# Patient Record
Sex: FEMALE | Race: WHITE
[De-identification: names, ages, dates, MRNs, and addresses within clinical notes are randomized per-mention and may not be internally consistent; named-entity substitution may affect disease eponyms.]

---

## 2022-04-17 ENCOUNTER — HOSPITAL ENCOUNTER (INPATIENT)
Dept: HOSPITAL 95 - ER | Age: 67
LOS: 4 days | Discharge: HOME HEALTH | DRG: 854 | End: 2022-04-21
Attending: SURGERY | Admitting: SURGERY
Payer: MEDICARE

## 2022-04-17 VITALS — WEIGHT: 293 LBS | HEIGHT: 63 IN | BODY MASS INDEX: 51.91 KG/M2

## 2022-04-17 DIAGNOSIS — Z79.899: ICD-10-CM

## 2022-04-17 DIAGNOSIS — A41.51: Primary | ICD-10-CM

## 2022-04-17 DIAGNOSIS — E66.01: ICD-10-CM

## 2022-04-17 DIAGNOSIS — I10: ICD-10-CM

## 2022-04-17 DIAGNOSIS — M19.90: ICD-10-CM

## 2022-04-17 DIAGNOSIS — I73.9: ICD-10-CM

## 2022-04-17 DIAGNOSIS — N13.6: ICD-10-CM

## 2022-04-17 DIAGNOSIS — E78.5: ICD-10-CM

## 2022-04-17 DIAGNOSIS — E03.9: ICD-10-CM

## 2022-04-17 DIAGNOSIS — Z79.84: ICD-10-CM

## 2022-04-17 DIAGNOSIS — E87.6: ICD-10-CM

## 2022-04-17 DIAGNOSIS — Z90.49: ICD-10-CM

## 2022-04-17 DIAGNOSIS — Z20.822: ICD-10-CM

## 2022-04-17 DIAGNOSIS — K42.0: ICD-10-CM

## 2022-04-17 DIAGNOSIS — Z66: ICD-10-CM

## 2022-04-17 DIAGNOSIS — E11.9: ICD-10-CM

## 2022-04-17 LAB
ALBUMIN SERPL BCP-MCNC: 3.4 G/DL (ref 3.4–5)
ALBUMIN/GLOB SERPL: 0.9 {RATIO} (ref 0.8–1.8)
ALT SERPL W P-5'-P-CCNC: 39 U/L (ref 12–78)
ANION GAP SERPL CALCULATED.4IONS-SCNC: 7 MMOL/L (ref 6–16)
AST SERPL W P-5'-P-CCNC: 18 U/L (ref 12–37)
BASOPHILS # BLD AUTO: 0.03 K/MM3 (ref 0–0.23)
BASOPHILS NFR BLD AUTO: 0 % (ref 0–2)
BILIRUB SERPL-MCNC: 0.9 MG/DL (ref 0.1–1)
BUN SERPL-MCNC: 19 MG/DL (ref 8–24)
CALCIUM SERPL-MCNC: 9.1 MG/DL (ref 8.5–10.1)
CHLORIDE SERPL-SCNC: 98 MMOL/L (ref 98–108)
CO2 SERPL-SCNC: 31 MMOL/L (ref 21–32)
CREAT SERPL-MCNC: 0.61 MG/DL (ref 0.4–1)
DEPRECATED RDW RBC AUTO: 46.3 FL (ref 35.1–46.3)
EOSINOPHIL # BLD AUTO: 0.02 K/MM3 (ref 0–0.68)
EOSINOPHIL NFR BLD AUTO: 0 % (ref 0–6)
ERYTHROCYTE [DISTWIDTH] IN BLOOD BY AUTOMATED COUNT: 15.1 % (ref 11.7–14.2)
GLOBULIN SER CALC-MCNC: 3.7 G/DL (ref 2.2–4)
GLUCOSE SERPL-MCNC: 205 MG/DL (ref 70–99)
HCT VFR BLD AUTO: 47.5 % (ref 33–51)
HGB BLD-MCNC: 14.7 G/DL (ref 11.5–16)
IMM GRANULOCYTES # BLD AUTO: 0.09 K/MM3 (ref 0–0.1)
IMM GRANULOCYTES NFR BLD AUTO: 1 % (ref 0–1)
KETONES UR STRIP-MCNC: (no result) MG/DL
LEUKOCYTE ESTERASE UR QL STRIP: (no result)
LYMPHOCYTES # BLD AUTO: 1.37 K/MM3 (ref 0.84–5.2)
LYMPHOCYTES NFR BLD AUTO: 9 % (ref 21–46)
MCHC RBC AUTO-ENTMCNC: 30.9 G/DL (ref 31.5–36.5)
MCV RBC AUTO: 85 FL (ref 80–100)
MONOCYTES # BLD AUTO: 0.62 K/MM3 (ref 0.16–1.47)
MONOCYTES NFR BLD AUTO: 4 % (ref 4–13)
NEUTROPHILS # BLD AUTO: 13.67 K/MM3 (ref 1.96–9.15)
NEUTROPHILS NFR BLD AUTO: 87 % (ref 41–73)
NRBC # BLD AUTO: 0 K/MM3 (ref 0–0.02)
NRBC BLD AUTO-RTO: 0 /100 WBC (ref 0–0.2)
PLATELET # BLD AUTO: 332 K/MM3 (ref 150–400)
POTASSIUM SERPL-SCNC: 3.9 MMOL/L (ref 3.5–5.5)
PROT SERPL-MCNC: 7.1 G/DL (ref 6.4–8.2)
PROT UR STRIP-MCNC: (no result) MG/DL
RBC #/AREA URNS HPF: (no result) /HPF (ref 0–2)
SODIUM SERPL-SCNC: 136 MMOL/L (ref 136–145)
SP GR SPEC: 1.02 (ref 1–1.02)
URN SPEC COLLECT METH UR: (no result)
UROBILINOGEN UR STRIP-MCNC: (no result) MG/DL

## 2022-04-17 PROCEDURE — C1894 INTRO/SHEATH, NON-LASER: HCPCS

## 2022-04-17 PROCEDURE — C1769 GUIDE WIRE: HCPCS

## 2022-04-17 PROCEDURE — 0WQF0ZZ REPAIR ABDOMINAL WALL, OPEN APPROACH: ICD-10-PCS | Performed by: SURGERY

## 2022-04-17 PROCEDURE — C1729 CATH, DRAINAGE: HCPCS

## 2022-04-17 PROCEDURE — A9270 NON-COVERED ITEM OR SERVICE: HCPCS

## 2022-04-17 PROCEDURE — 0DBU0ZZ EXCISION OF OMENTUM, OPEN APPROACH: ICD-10-PCS | Performed by: SURGERY

## 2022-04-17 PROCEDURE — C1751 CATH, INF, PER/CENT/MIDLINE: HCPCS

## 2022-04-17 PROCEDURE — 3E03329 INTRODUCTION OF OTHER ANTI-INFECTIVE INTO PERIPHERAL VEIN, PERCUTANEOUS APPROACH: ICD-10-PCS | Performed by: SURGERY

## 2022-04-18 LAB
ALBUMIN SERPL BCP-MCNC: 2.6 G/DL (ref 3.4–5)
ALBUMIN/GLOB SERPL: 0.9 {RATIO} (ref 0.8–1.8)
ALT SERPL W P-5'-P-CCNC: 30 U/L (ref 12–78)
ANION GAP SERPL CALCULATED.4IONS-SCNC: 7 MMOL/L (ref 6–16)
AST SERPL W P-5'-P-CCNC: 14 U/L (ref 12–37)
BASOPHILS # BLD AUTO: 0.03 K/MM3 (ref 0–0.23)
BASOPHILS NFR BLD AUTO: 0 % (ref 0–2)
BILIRUB SERPL-MCNC: 0.4 MG/DL (ref 0.1–1)
BUN SERPL-MCNC: 17 MG/DL (ref 8–24)
CALCIUM SERPL-MCNC: 8 MG/DL (ref 8.5–10.1)
CHLORIDE SERPL-SCNC: 102 MMOL/L (ref 98–108)
CO2 SERPL-SCNC: 30 MMOL/L (ref 21–32)
CREAT SERPL-MCNC: 0.62 MG/DL (ref 0.4–1)
DEPRECATED RDW RBC AUTO: 47 FL (ref 35.1–46.3)
EOSINOPHIL # BLD AUTO: 0 K/MM3 (ref 0–0.68)
EOSINOPHIL NFR BLD AUTO: 0 % (ref 0–6)
ERYTHROCYTE [DISTWIDTH] IN BLOOD BY AUTOMATED COUNT: 15.1 % (ref 11.7–14.2)
GLOBULIN SER CALC-MCNC: 2.9 G/DL (ref 2.2–4)
GLUCOSE SERPL-MCNC: 177 MG/DL (ref 70–99)
HCT VFR BLD AUTO: 39.5 % (ref 33–51)
HGB BLD-MCNC: 12.1 G/DL (ref 11.5–16)
IMM GRANULOCYTES # BLD AUTO: 0.1 K/MM3 (ref 0–0.1)
IMM GRANULOCYTES NFR BLD AUTO: 1 % (ref 0–1)
KETONES UR STRIP-MCNC: (no result) MG/DL
LEUKOCYTE ESTERASE UR QL STRIP: (no result)
LYMPHOCYTES # BLD AUTO: 1.18 K/MM3 (ref 0.84–5.2)
LYMPHOCYTES NFR BLD AUTO: 7 % (ref 21–46)
MAGNESIUM SERPL-MCNC: 1.9 MG/DL (ref 1.6–2.4)
MCHC RBC AUTO-ENTMCNC: 30.6 G/DL (ref 31.5–36.5)
MCV RBC AUTO: 86 FL (ref 80–100)
MONOCYTES # BLD AUTO: 0.7 K/MM3 (ref 0.16–1.47)
MONOCYTES NFR BLD AUTO: 4 % (ref 4–13)
NEUTROPHILS # BLD AUTO: 14.02 K/MM3 (ref 1.96–9.15)
NEUTROPHILS NFR BLD AUTO: 87 % (ref 41–73)
NRBC # BLD AUTO: 0 K/MM3 (ref 0–0.02)
NRBC BLD AUTO-RTO: 0 /100 WBC (ref 0–0.2)
PLATELET # BLD AUTO: 277 K/MM3 (ref 150–400)
POTASSIUM SERPL-SCNC: 3.2 MMOL/L (ref 3.5–5.5)
PROT SERPL-MCNC: 5.5 G/DL (ref 6.4–8.2)
PROT UR STRIP-MCNC: (no result) MG/DL
RBC #/AREA URNS HPF: (no result) /HPF (ref 0–2)
SODIUM SERPL-SCNC: 139 MMOL/L (ref 136–145)
SP GR SPEC: 1.01 (ref 1–1.02)
URN SPEC COLLECT METH UR: (no result)
UROBILINOGEN UR STRIP-MCNC: (no result) MG/DL
WBC #/AREA URNS HPF: (no result) /HPF (ref 0–5)

## 2022-04-18 NOTE — NUR
NOTIFIED DR CUELLAR AND CHARGE RN DUE TO PT DESATURATING INTO 60S WITH SLEEP.
WHEN PT WAKES UP, SATURATIONS IMPROVE BACK INTO THE 90S QUICKLY. CHARGE RN
SUGGESTED [facility]LOW CANNULA FOR EXTRA PRESSURE BUT DR CORTES STATES PT IS OK
ON NC AND SHOULD HAVE CONTINOUS BIOX WHEN GOING TO FLOOR

## 2022-04-18 NOTE — NUR
CALL TO SURGICAL FLOOR TO CHECK ON WISHES FOR CENTRAL LINE REMOVAL DUE TO THE
FACT THAT PT HAS 1 PERIPHERAL LINE AND POOR IV ACCESS. CLINICAL COORDINATOR ON
SURG STATED OK TO BRING OUT IF DR WAS COMFORTABLE WITH PT BEING ON SURGICAL
WITH IT. DR CUELLAR STATES OK TO SEND PT WITH IT SO THAT PT WILL CONTINUE TO HAVE
ACCESS.

## 2022-04-18 NOTE — NUR
PT EXTUBATED AT 1020. CURRENTLY ON 4LNC AT THIS TIME. NO ACUTE NEEDS. RESTING
QUIETLY. STATES PAIN IS 5/10. WILL MEDICATE AS ABLE

## 2022-04-18 NOTE — NUR
PATIENT ARRIVED TO UNIT FROM ICU. TRANSFERRED TO BED W/ OVERHEAD LIFT,
PATIENT TOLERATED WELL. REPORTS PAIN TO BE 5/10 TO ABDOMEN AND REPORTS THAT TO
BE A TOLERABLE LEVEL. MIDLINE INCIOSION W/ ALEXANDRA, QUARTER SIZE DRAINAGE
PRESENT. TIFFANY IN [place], DRAINING LIGHT RED FLUID. NEW IN PLACE DRAINING TO
GRAVITY, CLEAR YELLOW URINE. NG TUBE IN PLACE DRAINING LIGHT RED/ YELLOW FLUID
TO LOW INT SUCTION. 4L O2 IN PLACE VIA NC. DENIES CP/SOB, DENIES N/T, DENIES
N/V, REMAINS NPO. ORIENTED PATIENT TO ROOM & CALL LIGHT. WILL CONTINUE TO
MONITOR & REPORT TO ONCOMING RN.

## 2022-04-18 NOTE — NUR
DR CUELLAR CAME TO SEE PT AND CHANGED VENT SETTINGS TO PS WITH CURRENT SETTING
5/5 WITH 40% FIO2. SEDATION ON SB. DR FONTANEZ CAME TO SEE PT AND GAVE NO NEW
ORDERS ON HERNIA SITE AT THIS TIME. DRS STATE PT HAS A HYDRONEPHROSIS THAT
WILL BE ADDRESSED AT A LATER TIME, AFTER RESPIRATORY STATUS IS STABILIZED

## 2022-04-18 NOTE — NUR
ASSUMED CARE:
 
PT INTUBATED AND SEDATED WITH PROPOFOL AT 40MCG/KG. VENT SETTINGS
18/350/8/40%. OG IN PLACE WITH GREEN DRAINAGE, SET AT LIS. STOOL THIS AM,
BROWN, LIQUID. BT HYPOACTIVE. NEW IN PLACE DRAINING CLEAR YELLOW URINE.
BILATERAL WRIST RESTRAINTS TO PROTECT LINES. MIDLINE DRESSING WITH ALEXANDRA AND TIFFANY
DRAIN DRAINING CRANBERRY COLORED FLUID. NO ACUTE NEEDS AT THIS TIME.

## 2022-04-18 NOTE — NUR
CALL TO DR FONTANEZ REGARDING PT'S FLUID ORDER.  CHANGED FLUID TO LR.  AWARE
THAT BROWN OUTPUT NOTED AND 800ML IN CANISTER THIS SHIFT. PT C/O PAIN WITH
REPOSITIONING. CURRENT PAIN MED ORDERS TO REMAIN.  WISHES TO MONITOR PT A
FEW HOURS MORE IN ICU THEN OK TO MOVE TO SURG IF NO ACUTE ISSUES.

## 2022-04-18 NOTE — NUR
REPORT CALLED TO PARVI[facility] MONTEZ. PT TRANSFERRED WITH NG CLAMPED FOR LIS IN
SURGICAL FLOOR. TRANSFERRED VIA BED ON 2L. RN AWARE THAT PT IS TO BE ON
CONTINUOUS BIOX DUE DESATURATING WITH SLEEP. FAMILY CALLED FOR UPDATE AND WERE
MADE AWARE OF TRANSFER.

## 2022-04-18 NOTE — NUR
ARRIVAL TO ICU
PT ARRIVED TO ICU 16 AT 2205 FROM OR ON ICU BED. PT INTUBATED WITH VENT
SETTINGS AC/VC+ 18/375/8/40%. PT IS ALERT AND ANSWERING Y/N QUESTIONS
APPROPRIATLY; PT IS ABLE TO COMMUNICATE WITH HAND GESTURES TOO; PROPOFOL
STARTED AT 30MCG/KG/MIN AND TITRATED UP TO 40MCG/KG/MIN FOR VENT COMPLIENCE.
AFEBRILE. HR 60-80'S. -170'S. HYPOACTIVE BT; TIFFANY DRAIN IN PLACE AND
DRAINING SEROSANGUINOUS FLUID; DRESSING C/D/I; WOUND VAC TO MIDLINE ABD,
DRESSING INTACT; NG TO LIS. NEW IN PLACE AND DRAINING TO GRAVITY. SINGLE 20G
IV IN RT AC. NS INFUSING AT 75ML/HR. SEE ADDMISSION ASSESSMENT FOR FULL
ASSESSMENT.
 
ATTEMPTED TO PLACE ANOTHER PERIPHERAL IV AND NOT SUCCESSFUL. CHARGE RN MELISSA
ATTMEPTED TWO OTHER PERIPHERAL IV'S AND NOT SUCCESSFUL. MELISSA ATTEMPTED
POWERGLIDE AND WAS NOT ABLE TO PLACE ONE. DR RIVERA CALLED FOR A CENTRAL
LINE. PLAN FOR HIM TO PLACE A CENTRAL LINE.

## 2022-04-18 NOTE — NUR
END OF SHIFT SUMMARY
PT CONT TO BE INTUBATED WITH VENT SETTINGS AC/VC+ 18/375/8/40%. PROPOFOL
INFUSING AT 40MCG/KG/MIN; PT ABLE TO OPEN EYES TO VERBAL STIMULI. AFEBRILE. HR
60'S. -140'S. PT HAD TWO LARGE LOOSE BM'S THIS SHIFT; TIFFANY DRAINING WITH
50ML OUTPUT; WOUND VAC DRESSING INTACT; NG TO LIS. NEW INPLACE WITH 650ML
URINE OUTPUT. PT MYRNA FOLDS VERY RED AND YEAST LOOKING; PICTURES IN CHART;
AREA CLEANED AND DRIED. CENTRAL LINE TO RT IJ IN PLACE; PT TOLERATED
PROCEDURE WILL. NS INFUSING AT 75ML/HR. REPORT GIVEN TO GENA LAI RN.

## 2022-04-19 LAB
ALBUMIN SERPL BCP-MCNC: 2.4 G/DL (ref 3.4–5)
ANION GAP SERPL CALCULATED.4IONS-SCNC: 4 MMOL/L (ref 6–16)
BASOPHILS # BLD AUTO: 0.03 K/MM3 (ref 0–0.23)
BASOPHILS NFR BLD AUTO: 0 % (ref 0–2)
BUN SERPL-MCNC: 13 MG/DL (ref 8–24)
CALCIUM SERPL-MCNC: 8.2 MG/DL (ref 8.5–10.1)
CHLORIDE SERPL-SCNC: 105 MMOL/L (ref 98–108)
CO2 SERPL-SCNC: 33 MMOL/L (ref 21–32)
CREAT SERPL-MCNC: 0.66 MG/DL (ref 0.4–1)
DEPRECATED RDW RBC AUTO: 49.1 FL (ref 35.1–46.3)
EOSINOPHIL # BLD AUTO: 0.13 K/MM3 (ref 0–0.68)
EOSINOPHIL NFR BLD AUTO: 1 % (ref 0–6)
ERYTHROCYTE [DISTWIDTH] IN BLOOD BY AUTOMATED COUNT: 15.5 % (ref 11.7–14.2)
GLUCOSE SERPL-MCNC: 144 MG/DL (ref 70–99)
HCT VFR BLD AUTO: 40.5 % (ref 33–51)
HGB BLD-MCNC: 12.2 G/DL (ref 11.5–16)
IMM GRANULOCYTES # BLD AUTO: 0.11 K/MM3 (ref 0–0.1)
IMM GRANULOCYTES NFR BLD AUTO: 1 % (ref 0–1)
LYMPHOCYTES # BLD AUTO: 1.74 K/MM3 (ref 0.84–5.2)
LYMPHOCYTES NFR BLD AUTO: 11 % (ref 21–46)
MCHC RBC AUTO-ENTMCNC: 30.1 G/DL (ref 31.5–36.5)
MCV RBC AUTO: 87 FL (ref 80–100)
MONOCYTES # BLD AUTO: 0.91 K/MM3 (ref 0.16–1.47)
MONOCYTES NFR BLD AUTO: 6 % (ref 4–13)
NEUTROPHILS # BLD AUTO: 12.66 K/MM3 (ref 1.96–9.15)
NEUTROPHILS NFR BLD AUTO: 81 % (ref 41–73)
NRBC # BLD AUTO: 0 K/MM3 (ref 0–0.02)
NRBC BLD AUTO-RTO: 0 /100 WBC (ref 0–0.2)
PHOSPHATE SERPL-MCNC: 2.8 MG/DL (ref 2.5–4.9)
PLATELET # BLD AUTO: 249 K/MM3 (ref 150–400)
POTASSIUM SERPL-SCNC: 3.1 MMOL/L (ref 3.5–5.5)
SODIUM SERPL-SCNC: 142 MMOL/L (ref 136–145)

## 2022-04-19 NOTE — NUR
PT COMPLAINS OF LOWER BACK PAIN FREQUENTLY BUT CONTINUES TO DECLINE BEING
REPOSITIONED IN BED, EXTRA PILLOWS, AND A HEATING PAD. WILL CONT TO OFFER, BUT
PT ONLY WANTS HER HEAD OF BED RAISED AND LOWERED AT THIS TIME.

## 2022-04-19 NOTE — NUR
SHIFT SUMMARY
PT ADVANCED TO CLEAR LIQUIDS THIS AM. PT TOLERATING DIET WELL. PT'S NG TUBE
DCED DURING THIS SHIFT. PT OUT OF BED AND TO CHAIR W/ ASSISTANCE FROM PT. PT
TO BE NPO AT MIDNIGHT PER DR FONTANEZ. PT RECEIVED MEDICATIONS AS ORDERED
THROUGHOUT SHIFT. PT'S PAIN CONTROLLED W/ NORCO ADMINISTERED AS ORDERED. PT
RESTED THROUGHOUT DURATION OF SHIFT AND CONTINUED TO REST IN CHAIR AT THIS
TIME.

## 2022-04-19 NOTE — NUR
SHIFT SUMMARY
 
NO ACUTE CHANGES THIS SHIFT. NGT TO LIS WITH BROWN DRAINAGE. TIFFANY WITH MINIMAL
SS DRAINAGE. ALEXADNRA MIDLINE REMAINS INTACT. IVF INFUSING PER ORDERS. 50 MCG
FENTANYL FOR PAIN PRN. PT REFUSES TO BE REPOSITIONED IN BED T/O SHIFT. NEW
INTACT AND DRAINING. USES CALL LIGHT APPROPRIATELY.

## 2022-04-20 LAB
ANION GAP SERPL CALCULATED.4IONS-SCNC: 3 MMOL/L (ref 6–16)
BASOPHILS # BLD AUTO: 0.05 K/MM3 (ref 0–0.23)
BASOPHILS NFR BLD AUTO: 0 % (ref 0–2)
BUN SERPL-MCNC: 12 MG/DL (ref 8–24)
CALCIUM SERPL-MCNC: 8.3 MG/DL (ref 8.5–10.1)
CHLORIDE SERPL-SCNC: 103 MMOL/L (ref 98–108)
CO2 SERPL-SCNC: 34 MMOL/L (ref 21–32)
CREAT SERPL-MCNC: 0.54 MG/DL (ref 0.4–1)
DEPRECATED RDW RBC AUTO: 50.1 FL (ref 35.1–46.3)
EOSINOPHIL # BLD AUTO: 0.32 K/MM3 (ref 0–0.68)
EOSINOPHIL NFR BLD AUTO: 3 % (ref 0–6)
ERYTHROCYTE [DISTWIDTH] IN BLOOD BY AUTOMATED COUNT: 15.5 % (ref 11.7–14.2)
GLUCOSE SERPL-MCNC: 136 MG/DL (ref 70–99)
HCT VFR BLD AUTO: 39.4 % (ref 33–51)
HGB BLD-MCNC: 11.6 G/DL (ref 11.5–16)
IMM GRANULOCYTES # BLD AUTO: 0.09 K/MM3 (ref 0–0.1)
IMM GRANULOCYTES NFR BLD AUTO: 1 % (ref 0–1)
LYMPHOCYTES # BLD AUTO: 2.24 K/MM3 (ref 0.84–5.2)
LYMPHOCYTES NFR BLD AUTO: 18 % (ref 21–46)
MCHC RBC AUTO-ENTMCNC: 29.4 G/DL (ref 31.5–36.5)
MCV RBC AUTO: 89 FL (ref 80–100)
MONOCYTES # BLD AUTO: 0.68 K/MM3 (ref 0.16–1.47)
MONOCYTES NFR BLD AUTO: 5 % (ref 4–13)
NEUTROPHILS # BLD AUTO: 9.22 K/MM3 (ref 1.96–9.15)
NEUTROPHILS NFR BLD AUTO: 73 % (ref 41–73)
NRBC # BLD AUTO: 0 K/MM3 (ref 0–0.02)
NRBC BLD AUTO-RTO: 0 /100 WBC (ref 0–0.2)
PLATELET # BLD AUTO: 239 K/MM3 (ref 150–400)
POTASSIUM SERPL-SCNC: 3.4 MMOL/L (ref 3.5–5.5)
SODIUM SERPL-SCNC: 140 MMOL/L (ref 136–145)

## 2022-04-20 PROCEDURE — 0T9030Z DRAINAGE OF RIGHT KIDNEY WITH DRAINAGE DEVICE, PERCUTANEOUS APPROACH: ICD-10-PCS | Performed by: RADIOLOGY

## 2022-04-20 NOTE — NUR
SHIFT SUMMARY
 
NO ACUTE CHANGES. PT SLEPT IN CHAIR. 2 NORCO FOR ABD/LOWER BACK PAIN. ALEXANDRA
REMAINS COMPRESSED AND INTACT. TIFFANY WITH SMALL SS DRAINAGE. PT WITH NO FLATUS
YET. DENIES NAUSEA. NPO SINCE MIDNIGHT FOR IR PROCEDURE. NEW IN PLACE. IVF
INFUSING PER ORDERS. USES CALL LIGHT APPROPRIATELY.

## 2022-04-21 LAB
ANION GAP SERPL CALCULATED.4IONS-SCNC: 2 MMOL/L (ref 6–16)
BASOPHILS # BLD AUTO: 0.04 K/MM3 (ref 0–0.23)
BASOPHILS NFR BLD AUTO: 0 % (ref 0–2)
BUN SERPL-MCNC: 12 MG/DL (ref 8–24)
CALCIUM SERPL-MCNC: 8.2 MG/DL (ref 8.5–10.1)
CHLORIDE SERPL-SCNC: 102 MMOL/L (ref 98–108)
CO2 SERPL-SCNC: 34 MMOL/L (ref 21–32)
CREAT SERPL-MCNC: 0.63 MG/DL (ref 0.4–1)
DEPRECATED RDW RBC AUTO: 50 FL (ref 35.1–46.3)
EOSINOPHIL # BLD AUTO: 0.21 K/MM3 (ref 0–0.68)
EOSINOPHIL NFR BLD AUTO: 1 % (ref 0–6)
ERYTHROCYTE [DISTWIDTH] IN BLOOD BY AUTOMATED COUNT: 15.2 % (ref 11.7–14.2)
GLUCOSE SERPL-MCNC: 139 MG/DL (ref 70–99)
HCT VFR BLD AUTO: 37.9 % (ref 33–51)
HGB BLD-MCNC: 11.1 G/DL (ref 11.5–16)
IMM GRANULOCYTES # BLD AUTO: 0.08 K/MM3 (ref 0–0.1)
IMM GRANULOCYTES NFR BLD AUTO: 1 % (ref 0–1)
LYMPHOCYTES # BLD AUTO: 0.79 K/MM3 (ref 0.84–5.2)
LYMPHOCYTES NFR BLD AUTO: 5 % (ref 21–46)
MCHC RBC AUTO-ENTMCNC: 29.3 G/DL (ref 31.5–36.5)
MCV RBC AUTO: 90 FL (ref 80–100)
MONOCYTES # BLD AUTO: 0.51 K/MM3 (ref 0.16–1.47)
MONOCYTES NFR BLD AUTO: 3 % (ref 4–13)
NEUTROPHILS # BLD AUTO: 14.27 K/MM3 (ref 1.96–9.15)
NEUTROPHILS NFR BLD AUTO: 90 % (ref 41–73)
NRBC # BLD AUTO: 0 K/MM3 (ref 0–0.02)
NRBC BLD AUTO-RTO: 0 /100 WBC (ref 0–0.2)
PLATELET # BLD AUTO: 216 K/MM3 (ref 150–400)
POTASSIUM SERPL-SCNC: 3.7 MMOL/L (ref 3.5–5.5)
SODIUM SERPL-SCNC: 138 MMOL/L (ref 136–145)

## 2022-04-21 NOTE — NUR
NIGHT SHIFT SUMMARY
NO ACUTE CHANGES THIS SHIFT. PT AAOX4 AND PLEASANT. ALEXANDRA DRESSING TO MIDLINE
INCISION WITH SCANT AMOUNT OF OLD DRAINAGE, NOTHING NEW THIS SHIFT. TIFFANY
DRAINING SMALL AMOUNT OF SEROSANGUINOUS FLUID. R NEPHROSTOMY DRAINING YELLOW
URINE WITH SLIGHT PINK TINGE. MEDICATED FOR BACK AND HEADACHE PAIN X1 THIS
SHIFT. VSS, WILL CONTINUE TO MONITOR.

## 2022-04-21 NOTE — NUR
Patient now has discharge orders entered.
 
Gathered all supporting documentation for referral (face sheet, face to face,
med list, H&P, and most recent PT assessment) and sent to [facility]
for review.
 
No further interventions required.
 
Camille Ye
Referral Liaison

## 2022-04-21 NOTE — NUR
DISCHARGE SUMMARY
PT A&OX4, VSS/RA, STEVIE PO, VOIDING, AMB W/FWW, UP TO CHAIR T/O SHIFT, OXYGEN
DELIVERED BY [facility]/AND THEN HEADING TO PT'S HOME. DC INS PROVIDED. PT REP
UNDERSTANDING THOSE INSTRUCTIONS INCLUDING FU WITH PCP, SURGEON AND UROLOGY.
LEFT FLOOR VIA WC TO GO HOME WITH MOM, WITH ALL PERSONAL POSSESSIONS INCLUDING
DC PACKET AND 1 NARC SCRIPT.

## 2022-05-27 ENCOUNTER — HOSPITAL ENCOUNTER (OUTPATIENT)
Dept: HOSPITAL 95 - ER | Age: 67
Setting detail: OBSERVATION
LOS: 1 days | Discharge: HOME | End: 2022-05-28
Attending: HOSPITALIST | Admitting: HOSPITALIST
Payer: MEDICARE

## 2022-05-27 VITALS — HEIGHT: 63 IN | BODY MASS INDEX: 51.91 KG/M2 | WEIGHT: 293 LBS

## 2022-05-27 DIAGNOSIS — Z66: ICD-10-CM

## 2022-05-27 DIAGNOSIS — E03.9: ICD-10-CM

## 2022-05-27 DIAGNOSIS — N13.0: ICD-10-CM

## 2022-05-27 DIAGNOSIS — T83.022A: Primary | ICD-10-CM

## 2022-05-27 DIAGNOSIS — I10: ICD-10-CM

## 2022-05-27 DIAGNOSIS — Z79.84: ICD-10-CM
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
## 2022-05-27 DIAGNOSIS — E11.9: ICD-10-CM

## 2022-05-27 DIAGNOSIS — Z79.899: ICD-10-CM

## 2022-05-27 DIAGNOSIS — E66.01: ICD-10-CM

## 2022-05-27 DIAGNOSIS — E78.5: ICD-10-CM

## 2022-05-27 LAB
ALBUMIN SERPL BCP-MCNC: 3.1 G/DL (ref 3.4–5)
ALBUMIN/GLOB SERPL: 0.9 {RATIO} (ref 0.8–1.8)
ALT SERPL W P-5'-P-CCNC: 15 U/L (ref 12–78)
ANION GAP SERPL CALCULATED.4IONS-SCNC: 7 MMOL/L (ref 6–16)
AST SERPL W P-5'-P-CCNC: 8 U/L (ref 12–37)
BASOPHILS # BLD AUTO: 0.04 K/MM3 (ref 0–0.23)
BASOPHILS NFR BLD AUTO: 0 % (ref 0–2)
BILIRUB SERPL-MCNC: 0.4 MG/DL (ref 0.1–1)
BUN SERPL-MCNC: 14 MG/DL (ref 8–24)
CALCIUM SERPL-MCNC: 9.1 MG/DL (ref 8.5–10.1)
CHLORIDE SERPL-SCNC: 101 MMOL/L (ref 98–108)
CO2 SERPL-SCNC: 31 MMOL/L (ref 21–32)
CREAT SERPL-MCNC: 0.65 MG/DL (ref 0.4–1)
DEPRECATED RDW RBC AUTO: 46.6 FL (ref 35.1–46.3)
EOSINOPHIL # BLD AUTO: 0.22 K/MM3 (ref 0–0.68)
EOSINOPHIL NFR BLD AUTO: 2 % (ref 0–6)
ERYTHROCYTE [DISTWIDTH] IN BLOOD BY AUTOMATED COUNT: 14.6 % (ref 11.7–14.2)
GLOBULIN SER CALC-MCNC: 3.3 G/DL (ref 2.2–4)
GLUCOSE SERPL-MCNC: 153 MG/DL (ref 70–99)
HCT VFR BLD AUTO: 40.6 % (ref 33–51)
HGB BLD-MCNC: 12 G/DL (ref 11.5–16)
IMM GRANULOCYTES # BLD AUTO: 0.08 K/MM3 (ref 0–0.1)
IMM GRANULOCYTES NFR BLD AUTO: 1 % (ref 0–1)
LEUKOCYTE ESTERASE UR QL STRIP: (no result)
LYMPHOCYTES # BLD AUTO: 2.37 K/MM3 (ref 0.84–5.2)
LYMPHOCYTES NFR BLD AUTO: 19 % (ref 21–46)
MCHC RBC AUTO-ENTMCNC: 29.6 G/DL (ref 31.5–36.5)
MCV RBC AUTO: 87 FL (ref 80–100)
MONOCYTES # BLD AUTO: 0.51 K/MM3 (ref 0.16–1.47)
MONOCYTES NFR BLD AUTO: 4 % (ref 4–13)
NEUTROPHILS # BLD AUTO: 9.52 K/MM3 (ref 1.96–9.15)
NEUTROPHILS NFR BLD AUTO: 75 % (ref 41–73)
NRBC # BLD AUTO: 0 K/MM3 (ref 0–0.02)
NRBC BLD AUTO-RTO: 0 /100 WBC (ref 0–0.2)
PLATELET # BLD AUTO: 253 K/MM3 (ref 150–400)
POTASSIUM SERPL-SCNC: 4.2 MMOL/L (ref 3.5–5.5)
PROT SERPL-MCNC: 6.4 G/DL (ref 6.4–8.2)
RBC #/AREA URNS HPF: (no result) /HPF (ref 0–2)
SODIUM SERPL-SCNC: 139 MMOL/L (ref 136–145)
SP GR SPEC: 1.01 (ref 1–1.02)
UROBILINOGEN UR STRIP-MCNC: (no result) MG/DL
WBC #/AREA URNS HPF: (no result) /HPF (ref 0–5)

## 2022-05-27 PROCEDURE — C2617 STENT, NON-COR, TEM W/O DEL: HCPCS

## 2022-05-27 PROCEDURE — G0378 HOSPITAL OBSERVATION PER HR: HCPCS

## 2022-05-27 PROCEDURE — C1887 CATHETER, GUIDING: HCPCS

## 2022-05-27 PROCEDURE — A9270 NON-COVERED ITEM OR SERVICE: HCPCS

## 2022-05-27 PROCEDURE — C1769 GUIDE WIRE: HCPCS

## 2022-05-27 NOTE — NUR
telemetry: SR 70's. Unable to place SCD's per order due to large
size of patient. Tegaderm dressing placed over right nephrostomy puncture site
to secure tubing.  nephrostomy putting out pinkish kendra urine.

## 2022-05-28 LAB
ANION GAP SERPL CALCULATED.4IONS-SCNC: 5 MMOL/L (ref 6–16)
BUN SERPL-MCNC: 11 MG/DL (ref 8–24)
CALCIUM SERPL-MCNC: 9.1 MG/DL (ref 8.5–10.1)
CHLORIDE SERPL-SCNC: 100 MMOL/L (ref 98–108)
CO2 SERPL-SCNC: 34 MMOL/L (ref 21–32)
CREAT SERPL-MCNC: 0.73 MG/DL (ref 0.4–1)
GLUCOSE SERPL-MCNC: 187 MG/DL (ref 70–99)
POTASSIUM SERPL-SCNC: 4.6 MMOL/L (ref 3.5–5.5)
SODIUM SERPL-SCNC: 139 MMOL/L (ref 136–145)

## 2022-05-28 NOTE — NUR
Patient slept well after receiving HS ibuprophen and a lite dinner.
Right nephrostomy site soft flat and dry without leakage or redness. Tegaderm
remains in place for protection. Drainage clear and kendra (slightly pink)

## 2022-05-28 NOTE — NUR
DISCHARGE SUMMARY
PATIENT DISCHARGED HOME. DISCHARGE INSTRUCTIONS REVIEWED WITH PATIENT. ALL
QUESTIONS ANSWERED. NO NEW PRESCRIPTIONS NEEDED. IV AND TELE D/C'D. PATIENT
AND ALL BELONGINGS INCLUDING WALKER TAKEN HOME WITH PATIENT. PATIENT
TRANSPORTED VIA WHEELCHAIR TO PERSONAL VEHICLE AND TAKEN HOME BY FAMILY
MEMBER.